# Patient Record
Sex: MALE | Race: WHITE
[De-identification: names, ages, dates, MRNs, and addresses within clinical notes are randomized per-mention and may not be internally consistent; named-entity substitution may affect disease eponyms.]

---

## 2018-03-23 ENCOUNTER — HOSPITAL ENCOUNTER (INPATIENT)
Dept: HOSPITAL 92 - SURG A | Age: 48
LOS: 7 days | Discharge: HOME | DRG: 235 | End: 2018-03-30
Attending: THORACIC SURGERY (CARDIOTHORACIC VASCULAR SURGERY) | Admitting: THORACIC SURGERY (CARDIOTHORACIC VASCULAR SURGERY)
Payer: COMMERCIAL

## 2018-03-23 ENCOUNTER — HOSPITAL ENCOUNTER (OUTPATIENT)
Dept: HOSPITAL 92 - LABBT | Age: 48
End: 2018-03-23
Attending: THORACIC SURGERY (CARDIOTHORACIC VASCULAR SURGERY)
Payer: COMMERCIAL

## 2018-03-23 VITALS — BODY MASS INDEX: 32.6 KG/M2

## 2018-03-23 DIAGNOSIS — Z87.891: ICD-10-CM

## 2018-03-23 DIAGNOSIS — Z79.82: ICD-10-CM

## 2018-03-23 DIAGNOSIS — Z01.812: Primary | ICD-10-CM

## 2018-03-23 DIAGNOSIS — I25.10: ICD-10-CM

## 2018-03-23 DIAGNOSIS — J45.909: ICD-10-CM

## 2018-03-23 DIAGNOSIS — E78.5: ICD-10-CM

## 2018-03-23 DIAGNOSIS — I10: ICD-10-CM

## 2018-03-23 DIAGNOSIS — Z79.02: ICD-10-CM

## 2018-03-23 DIAGNOSIS — I25.118: Primary | ICD-10-CM

## 2018-03-23 DIAGNOSIS — J96.01: ICD-10-CM

## 2018-03-23 DIAGNOSIS — E66.01: ICD-10-CM

## 2018-03-23 LAB
ANION GAP SERPL CALC-SCNC: 13 MMOL/L (ref 10–20)
BUN SERPL-MCNC: 17 MG/DL (ref 8.9–20.6)
CALCIUM SERPL-MCNC: 9.5 MG/DL (ref 7.8–10.44)
CHLORIDE SERPL-SCNC: 103 MMOL/L (ref 98–107)
CO2 SERPL-SCNC: 26 MMOL/L (ref 22–29)
CREAT CL PREDICTED SERPL C-G-VRATE: 0 ML/MIN (ref 70–130)
GLUCOSE SERPL-MCNC: 96 MG/DL (ref 70–105)
HGB BLD-MCNC: 16.4 G/DL (ref 14–18)
MCH RBC QN AUTO: 31.3 PG (ref 27–31)
MCV RBC AUTO: 91 FL (ref 80–94)
PLATELET # BLD AUTO: 291 THOU/UL (ref 130–400)
POTASSIUM SERPL-SCNC: 3.7 MMOL/L (ref 3.5–5.1)
RBC # BLD AUTO: 5.24 MILL/UL (ref 4.7–6.1)
SODIUM SERPL-SCNC: 138 MMOL/L (ref 136–145)
WBC # BLD AUTO: 8.1 THOU/UL (ref 4.8–10.8)

## 2018-03-23 PROCEDURE — 94002 VENT MGMT INPAT INIT DAY: CPT

## 2018-03-23 PROCEDURE — 93010 ELECTROCARDIOGRAM REPORT: CPT

## 2018-03-23 PROCEDURE — 93005 ELECTROCARDIOGRAM TRACING: CPT

## 2018-03-23 PROCEDURE — 93798 PHYS/QHP OP CAR RHAB W/ECG: CPT

## 2018-03-23 PROCEDURE — P9045 ALBUMIN (HUMAN), 5%, 250 ML: HCPCS

## 2018-03-23 PROCEDURE — S0017 INJECTION, AMINOCAPROIC ACID: HCPCS

## 2018-03-23 PROCEDURE — 86901 BLOOD TYPING SEROLOGIC RH(D): CPT

## 2018-03-23 PROCEDURE — 80048 BASIC METABOLIC PNL TOTAL CA: CPT

## 2018-03-23 PROCEDURE — 71045 X-RAY EXAM CHEST 1 VIEW: CPT

## 2018-03-23 PROCEDURE — 82805 BLOOD GASES W/O2 SATURATION: CPT

## 2018-03-23 PROCEDURE — 85025 COMPLETE CBC W/AUTO DIFF WBC: CPT

## 2018-03-23 PROCEDURE — 85027 COMPLETE CBC AUTOMATED: CPT

## 2018-03-23 PROCEDURE — 36416 COLLJ CAPILLARY BLOOD SPEC: CPT

## 2018-03-23 PROCEDURE — 85730 THROMBOPLASTIN TIME PARTIAL: CPT

## 2018-03-23 PROCEDURE — 36415 COLL VENOUS BLD VENIPUNCTURE: CPT

## 2018-03-23 PROCEDURE — 86900 BLOOD TYPING SEROLOGIC ABO: CPT

## 2018-03-23 PROCEDURE — 85610 PROTHROMBIN TIME: CPT

## 2018-03-23 PROCEDURE — 86850 RBC ANTIBODY SCREEN: CPT

## 2018-03-23 PROCEDURE — S0028 INJECTION, FAMOTIDINE, 20 MG: HCPCS

## 2018-03-24 NOTE — EKG
Test Reason : 

Blood Pressure : ***/*** mmHG

Vent. Rate : 096 BPM     Atrial Rate : 096 BPM

   P-R Int : 146 ms          QRS Dur : 092 ms

    QT Int : 352 ms       P-R-T Axes : 052 -42 087 degrees

   QTc Int : 444 ms

 

Normal sinus rhythm

Left axis deviation

Abnormal ECG

No previous ECGs available

Confirmed by FAVIOLA CAMPOS, DR. PIMENTEL ([id number]) on 3/24/2018 7:08:54 PM

 

Referred By:  YOMAIRA           Confirmed By:DR. MARGARITO SALMERON MD

## 2018-03-26 LAB
ANALYZER IN CARDIO: (no result)
ANALYZER IN CARDIO: (no result)
ANION GAP SERPL CALC-SCNC: 11 MMOL/L (ref 10–20)
APTT PPP: 27.9 SEC (ref 22.9–36.1)
BASE EXCESS STD BLDA CALC-SCNC: -4.4 MEQ/L
BASE EXCESS STD BLDA CALC-SCNC: -4.8 MEQ/L
BASOPHILS # BLD AUTO: 0.1 THOU/UL (ref 0–0.2)
BASOPHILS NFR BLD AUTO: 0.4 % (ref 0–1)
BUN SERPL-MCNC: 18 MG/DL (ref 8.9–20.6)
CA-I BLDA-SCNC: 1.1 MMOL/L (ref 1.12–1.3)
CA-I BLDA-SCNC: 1.1 MMOL/L (ref 1.12–1.3)
CALCIUM SERPL-MCNC: 7.6 MG/DL (ref 7.8–10.44)
CHLORIDE SERPL-SCNC: 109 MMOL/L (ref 98–107)
CO2 SERPL-SCNC: 21 MMOL/L (ref 22–29)
CREAT CL PREDICTED SERPL C-G-VRATE: 94 ML/MIN (ref 70–130)
EOSINOPHIL # BLD AUTO: 0.1 THOU/UL (ref 0–0.7)
EOSINOPHIL NFR BLD AUTO: 0.9 % (ref 0–10)
GLUCOSE SERPL-MCNC: 144 MG/DL (ref 70–105)
HCO3 BLDA-SCNC: 20.1 MEQ/L (ref 22–26)
HCO3 BLDA-SCNC: 20.5 MEQ/L (ref 22–26)
HCT VFR BLDA CALC: 33.7 % (ref 42–52)
HCT VFR BLDA CALC: 34.6 % (ref 42–52)
HGB BLD-MCNC: 12 G/DL (ref 14–18)
HGB BLD-MCNC: 12.2 G/DL (ref 14–18)
HGB BLDA-MCNC: 11.9 G/DL (ref 14–18)
HGB BLDA-MCNC: 12.2 G/DL (ref 14–18)
INR PPP: 1.3
LYMPHOCYTES # BLD: 2.1 THOU/UL (ref 1.2–3.4)
LYMPHOCYTES NFR BLD AUTO: 12.7 % (ref 21–51)
MCH RBC QN AUTO: 31.6 PG (ref 27–31)
MCV RBC AUTO: 91.5 FL (ref 80–94)
MONOCYTES # BLD AUTO: 0.9 THOU/UL (ref 0.11–0.59)
MONOCYTES NFR BLD AUTO: 5.6 % (ref 0–10)
NEUTROPHILS # BLD AUTO: 12.9 THOU/UL (ref 1.4–6.5)
NEUTROPHILS NFR BLD AUTO: 80.3 % (ref 42–75)
O2 A-A PPRESDIFF RESPIRATORY: 146.12 MM[HG] (ref 0–20)
O2 A-A PPRESDIFF RESPIRATORY: 62.58 MM[HG] (ref 0–20)
PCO2 BLDA: 36.5 MMHG (ref 35–45)
PCO2 BLDA: 37.1 MMHG (ref 35–45)
PH BLDA: 7.36 [PH] (ref 7.35–7.45)
PH BLDA: 7.36 [PH] (ref 7.35–7.45)
PLATELET # BLD AUTO: 172 THOU/UL (ref 130–400)
PO2 BLDA: 164 MMHG (ref 80–100)
PO2 BLDA: 177 MMHG (ref 80–100)
POTASSIUM SERPL-SCNC: 4 MMOL/L (ref 3.5–5.1)
POTASSIUM SERPL-SCNC: 5.2 MMOL/L (ref 3.5–5.1)
PROTHROMBIN TIME: 16 SEC (ref 12–14.7)
RBC # BLD AUTO: 3.85 MILL/UL (ref 4.7–6.1)
SODIUM SERPL-SCNC: 137 MMOL/L (ref 136–145)
SPECIMEN DRAWN FROM PATIENT: (no result)
SPECIMEN DRAWN FROM PATIENT: (no result)
WBC # BLD AUTO: 16.1 THOU/UL (ref 4.8–10.8)

## 2018-03-26 PROCEDURE — 5A1221Z PERFORMANCE OF CARDIAC OUTPUT, CONTINUOUS: ICD-10-PCS | Performed by: THORACIC SURGERY (CARDIOTHORACIC VASCULAR SURGERY)

## 2018-03-26 PROCEDURE — 02100AW BYPASS CORONARY ARTERY, ONE ARTERY FROM AORTA WITH AUTOLOGOUS ARTERIAL TISSUE, OPEN APPROACH: ICD-10-PCS | Performed by: THORACIC SURGERY (CARDIOTHORACIC VASCULAR SURGERY)

## 2018-03-26 PROCEDURE — 03BC4ZZ EXCISION OF LEFT RADIAL ARTERY, PERCUTANEOUS ENDOSCOPIC APPROACH: ICD-10-PCS | Performed by: THORACIC SURGERY (CARDIOTHORACIC VASCULAR SURGERY)

## 2018-03-26 PROCEDURE — 021209W BYPASS CORONARY ARTERY, THREE ARTERIES FROM AORTA WITH AUTOLOGOUS VENOUS TISSUE, OPEN APPROACH: ICD-10-PCS | Performed by: THORACIC SURGERY (CARDIOTHORACIC VASCULAR SURGERY)

## 2018-03-26 PROCEDURE — 06BQ4ZZ EXCISION OF LEFT SAPHENOUS VEIN, PERCUTANEOUS ENDOSCOPIC APPROACH: ICD-10-PCS | Performed by: THORACIC SURGERY (CARDIOTHORACIC VASCULAR SURGERY)

## 2018-03-26 PROCEDURE — 02100Z9 BYPASS CORONARY ARTERY, ONE ARTERY FROM LEFT INTERNAL MAMMARY, OPEN APPROACH: ICD-10-PCS | Performed by: THORACIC SURGERY (CARDIOTHORACIC VASCULAR SURGERY)

## 2018-03-26 RX ADMIN — INSULIN HUMAN PRN UNIT: 100 INJECTION, SOLUTION PARENTERAL at 23:53

## 2018-03-26 RX ADMIN — INSULIN HUMAN PRN UNIT: 100 INJECTION, SOLUTION PARENTERAL at 13:09

## 2018-03-26 RX ADMIN — Medication SCH GM: at 21:16

## 2018-03-26 RX ADMIN — Medication SCH GM: at 13:35

## 2018-03-26 RX ADMIN — HYDROCODONE BITARTRATE AND ACETAMINOPHEN PRN TAB: 5; 325 TABLET ORAL at 23:48

## 2018-03-26 NOTE — CON
DATE OF CONSULTATION:  03/26/2018

 

SERVICE:  Pulmonary Medicine.

 

REASON FOR CONSULTATION:  ICU patient.

 

HISTORY OF PRESENT ILLNESS:  The patient is a 48-year-old white male with past medical history signif
icant for morbid obesity, dyslipidemia, and hypertension.  He was in his usual state of health when s
tarted having onset of increasing dyspnea that limited his activity.  He was being worked up in the o
[facility] setting where he was demonstrated to have severe 3-vessel disease.  He was referred for a c
oronary bypass graft surgery.  He is immediately postop day #0 from that surgery.  He is breathing co
mfortably on mechanical ventilation.  The weaning protocol is in place.  He is awake and alert.  He i
s following all commands.  Otherwise, he was in his usual state of health prior to the operation.

 

PAST MEDICAL HISTORY:

1.  Asthma.

2.  Dyslipidemia.

3.  Hypertension.

4.  Coronary artery disease.

 

PAST SURGICAL HISTORY:

1.  Herniorrhaphy.

2.  Right shoulder surgery.

3.  Repair of femur fracture.

 

FAMILY HISTORY:  Noncontributory.

 

SOCIAL HISTORY:  He drinks some alcohol occasionally.  He denies any illicit drugs or smoking cigaret
katarina.  He did some chewing tobacco previously.  He has no exposure to chemicals, dust asbestos or tube
rculosis.

 

ALLERGIES:  No known drug allergies.

 

MEDICATIONS:  List of his inpatient medications were reviewed.  There are no specific updates made at
 this time.

 

REVIEW OF SYSTEMS:  General, head, ears, eyes, nose, throat, cardiovascular, respiratory, GI, , mus
culoskeletal, neurologic and skin is negative except as mentioned in the HPI.

 

PHYSICAL EXAMINATION:

VITAL SIGNS:  Afebrile, pulse 82, blood pressure 94/46, respirations 8, saturation 100% on 2 liters n
yeni cannula.

HEENT:  Normocephalic, atraumatic.  Sclerae are white, conjunctivae pink.  Oral and nasal mucosa is m
oist without lesions.

LUNGS:  Decent air entry.  He has no prolonged expiratory phase.  There are some crackles present, bu
t I do not appreciate rhonchi or wheezing currently.

HEART:  Normal rate, regular.

ABDOMEN:  Soft.  Tender to palpation, particularly in the epigastric region.  There is no rebound or 
guarding.  Bowel sounds are hypoactive.

GENITOURINARY:  Cooper catheter in place.

NEUROLOGIC:  Grossly nonfocal.

 

LABORATORY DATA:  WBC 16.1, hemoglobin 12.0 and roughly stable.  INR is 1.3.  PH of 7.36, pCO2 of 37,
 pO2 of 164.  Creatinine is 1.39 and roughly stable.  Basic metabolic profile is otherwise unremarkab
le.  Calcium is 7.6, however.

 

IMAGING:  Chest x-ray demonstrates a left-sided chest tube.  There is a mediastinal drain is also in 
place.  Median sternotomy wires are there.  The endotracheal tube terminates roughly 5 cm above the l
evel of the radha.  There is likely a left-sided pleural effusion present.  The right costophrenic a
ngle remains sharp.  The subclavian central venous catheter terminates in the right atrium.

 

ASSESSMENT:

1.  Acute hypoxic respiratory failure.

2.  Asthma without acute exacerbation.

3.  Coronary artery disease, status post coronary artery bypass graft x5 vessels.

 

PLAN:  We will provide the patient with p.r.n. nebulized medications.  We will continue our routine p
ostop care.  Pulmonary Critical Care will continue to follow in this location.  Ultimately, the patie
nt may benefit from outpatient evaluation for sleep apnea.

 

CRITICAL CARE TIME:  30 minutes.

## 2018-03-26 NOTE — OP
POSTOPERATIVE DIAGNOSIS:  Coronary artery disease.

 

PROCEDURE:  Coronary artery bypass graft x5, good quality LIMA to a 1.5 mm LAD at the takeoff of the 
second diagonal and 1.25 mm LAD distally on the heart.  Good quality radial artery to a 1.25 mm OM, s
aphenous vein good quality to a 1.5 mm proximal diagonal and a 2 mm posterolateral.

 

SURGEON:  Dr. Saunders.

 

ASSISTANT:  Dr. Mckeon.

 

TRANSFUSION:  None.

 

PROCEDURE IN DETAIL:  After adequate anesthesia had been obtained, the patient was prepped and draped
 and I harvested the left radial artery.  Collateral flow was ensured with plethysmography and that t
his wound was closed.  I then began the endovascular vein harvest of the greater saphenous vein, mobi
lizing the vein on the left.  Prior to dividing any branches, I turned my attention to median sternot
donnell where left internal mammary artery was harvested, at which point Dr. Mckeon came and completed the
 endovascular vein harvest.  The patient was heparinized, mammary divided distally, and passed poster
ior to the thymus gland.  Aorta and right atrium were cannulated and cardiopulmonary bypass was insti
tuted.  Vessels were inspected, aorta cross-clamped, and a liter of cold blood cardioplegia was given
 through the aortic root.  The lateral aspect of the heart had significant scar tissue related to pre
vious occlusion of the circumflex system.  Transesophageal echo, however, showed there was fairly goo
d lateral wall motion.  Distal anastomoses were all completed following which the cross-clamp was rem
kori and the partial occluding clamp placed and 2 vein grafts anastomosed to the aortic root from the
 right and diagonal vein grafts.  These were marked with rings following which the radial artery was 
anastomosed to the shane of the diagonal vein graft.  The patient was then weaned from cardiopulmonary
 bypass, cannulas removed, and protamine given systemically.  Aortic cannulation site was secured wit
h a 4-0 Prolene suture.  Mediastinal and left pleural drains were placed, following which the sternum
 was reapproximated with #7 interrupted wire using vancomycin paste on the sternal edges, platelet ri
ch blood, and platelet-poor plasma.  Subcutaneous tissue and skin were closed in layers.

## 2018-03-26 NOTE — RAD
PORTABLE CHEST 1 VIEW:

 

Date:  03/26/18 

Time:  1223 hours

 

HISTORY:  

Postop open heart surgery. Respiratory failure. 

 

FINDINGS/IMPRESSION: 

There are changes of median sternotomy. There is an endotracheal tube with tip at the level of the cl
avicular heads. A nasogastric tube is seen in the stomach. There is a right subclavian central line w
ith tip in the projection of the SVC. The heart size is enlarged. Mediastinal and left-sided chest tu
bes are present. No lobar consolidation, pneumothoraces, sindy pulmonary edema, or large effusions ar
e identified. A small left pleural effusion may be present. 

 

 

POS: [facility]

## 2018-03-27 LAB
ANION GAP SERPL CALC-SCNC: 10 MMOL/L (ref 10–20)
BASOPHILS # BLD AUTO: 0 THOU/UL (ref 0–0.2)
BASOPHILS NFR BLD AUTO: 0.2 % (ref 0–1)
BUN SERPL-MCNC: 17 MG/DL (ref 8.9–20.6)
CALCIUM SERPL-MCNC: 7.8 MG/DL (ref 7.8–10.44)
CHLORIDE SERPL-SCNC: 107 MMOL/L (ref 98–107)
CO2 SERPL-SCNC: 24 MMOL/L (ref 22–29)
CREAT CL PREDICTED SERPL C-G-VRATE: 93 ML/MIN (ref 70–130)
EOSINOPHIL # BLD AUTO: 0 THOU/UL (ref 0–0.7)
EOSINOPHIL NFR BLD AUTO: 0.1 % (ref 0–10)
GLUCOSE SERPL-MCNC: 117 MG/DL (ref 70–105)
HGB BLD-MCNC: 12.1 G/DL (ref 14–18)
LYMPHOCYTES # BLD: 2.1 THOU/UL (ref 1.2–3.4)
LYMPHOCYTES NFR BLD AUTO: 18.9 % (ref 21–51)
MCH RBC QN AUTO: 32.4 PG (ref 27–31)
MCV RBC AUTO: 93.5 FL (ref 80–94)
MONOCYTES # BLD AUTO: 1.3 THOU/UL (ref 0.11–0.59)
MONOCYTES NFR BLD AUTO: 11.9 % (ref 0–10)
NEUTROPHILS # BLD AUTO: 7.6 THOU/UL (ref 1.4–6.5)
NEUTROPHILS NFR BLD AUTO: 68.8 % (ref 42–75)
PLATELET # BLD AUTO: 208 THOU/UL (ref 130–400)
POTASSIUM SERPL-SCNC: 4.4 MMOL/L (ref 3.5–5.1)
RBC # BLD AUTO: 3.72 MILL/UL (ref 4.7–6.1)
SODIUM SERPL-SCNC: 137 MMOL/L (ref 136–145)
WBC # BLD AUTO: 11.1 THOU/UL (ref 4.8–10.8)

## 2018-03-27 RX ADMIN — Medication SCH GM: at 05:13

## 2018-03-27 RX ADMIN — HYDROCODONE BITARTRATE AND ACETAMINOPHEN PRN TAB: 5; 325 TABLET ORAL at 04:48

## 2018-03-27 NOTE — RAD
PORTABLE UPRIGHT FRONTAL CHEST RADIOGRAPH:

 

Date: 3-27-18 

 

Comparison: 3-16-18

 

History: Evaluate chest following open heart surgery. 

 

FINDINGS: 

Post-surgical drainage catheters overlie the lower left hemithorax and the mediastinum. Endotracheal 
tube and nasogastric tube have been removed. Midline sternotomy wires are unchanged. Stable right wally
ed vascular catheter. Mild hazy increased density is seen in the medial left lung base suggesting vol
ume loss. No focal consolidation or alveolar edema. 

 

IMPRESSION: 

Lines and tubes as detailed above. Mild patchy opacity in the medial left base noted.  

 

 

 

POS: [facility]

## 2018-03-27 NOTE — PDOC.CTH
Cardiology Progress Note





- Subjective





He had his chest tubes our and is on the floor now. He is having a lot of pain 

issues on current pain regimen. 





- Objective


 Vital Signs











  Temp Pulse Pulse Pulse Resp BP BP


 


 03/27/18 16:48  99.3 F  89    16  


 


 03/27/18 12:55  98.5 F      


 


 03/27/18 10:00  97.9 F  69    16  


 


 03/27/18 08:44    69  79   134/56 L  103/68


 


 03/27/18 07:10  98.1 F  72    12  














  BP Pulse Ox Pulse Ox Pulse Ox


 


 03/27/18 16:48  118/68  91 L  


 


 03/27/18 12:55  114/68   


 


 03/27/18 10:00   91 L  


 


 03/27/18 08:44    93 L  93 L


 


 03/27/18 07:10   94 L  








 











Weight                         226 lb 13.69 oz














 











 03/26/18 03/27/18 03/28/18





 06:59 06:59 06:59


 


Intake Total  2976 


 


Output Total  2325 270


 


Balance  651 -270














- Physical Examination


General/Neuro: alert & oriented x3, NAD


Neck: no JVD present


Lungs: CTA, unlabored respirations


Heart: RRR


Abdomen: NT/ND


Extremities: + edema B (1+)





- Telemetry


Telemetry Rhythm: NSR





- Labs


Result Diagrams: 


 03/27/18 04:16





 03/27/18 04:16





- Assessment/Plan





1. Multivessel CAD.


2. S/P CABG


3. HTN


4. HLP





PLAN:


- Increase PT as tolerated.


- Will start low dose BB. ACEI if BP allows.


- Aspirin and statin for life. 


- Will try toradol for pain.

## 2018-03-27 NOTE — PRG
DATE OF SERVICE:  03/27/2018

 

SERVICE:  Pulmonary Medicine.

 

INTERVAL HISTORY:  The patient is doing fine from a respiratory standpoint.  He was having significan
t amounts of back discomfort this morning.  When the chest tubes were pulled, it is eased up a little
 bit, although it is still there.  He previously had horrendous pleuritic chest discomfort that made 
for multiple nights.  That being said, this is also improved.  He denies any current fevers, chills o
r overnight events.

 

PHYSICAL EXAMINATION:

VITAL SIGNS:  Afebrile, pulse 72, blood pressure 199/64, respirations 12, saturation 94% on 2 liters 
nasal cannula.

GENERAL:  The patient is awake, alert, no apparent distress.

LUNGS:  Decent air entry.  I do not appreciate wheezing, rhonchi, or crackles today.

HEART:  Normal rate, regular.

ABDOMEN:  Soft, nontender, nondistended.  Bowel sounds are positive.

MUSCULOSKELETAL:  No cyanosis or clubbing.  There is a trace pitting in the bilateral lower extremiti
es.

NEUROLOGIC:  Grossly nonfocal.

 

LABORATORY DATA:  WBC 11.1, hemoglobin 12.1 and stable, platelets 208,000.  INR 1.3.  PH 7.36, pCO2 3
6, pO2 177 as of yesterday.  Creatinine 1.41 and roughly stable.  Basic metabolic profile is otherwis
e unremarkable.  Calcium 7.8.

 

IMAGING:  Chest x-ray demonstrates stable tubes and lines.  Interval extubation has been performed.  
There is mediastinal, and left-sided chest tube which remain in stable position.  A right subclavian 
central venous catheter terminates at the cavoatrial junction.  No obvious effusion or infiltrate is 
evident.  There seems to be a little volume loss in both lungs.

 

ASSESSMENT:

1.  Acute hypoxic respiratory failure, possibly secondary to a little atelectasis.

2.  Asthma without acute exacerbation.

3.  Coronary artery disease, status post coronary artery bypass graft x5 vessels, postop day #1.

 

PLAN:  We will continue p.r.n. nebulized medications.  In the outpatient setting, the patient may kaylee
efit from a sleep apnea evaluation.  We will talk to the patient about whether or not he is intereste
d in this on the floor.

## 2018-03-27 NOTE — CON
DATE OF CONSULTATION:  03/26/2018

 

REASON FOR CONSULTATION:  Coronary artery disease, bypass surgery.

 

HISTORY OF PRESENT ILLNESS:  Mr. Chadwick is a very pleasant 48-year-old white gentleman, very well know
n to myself who comes to the hospital for a planned bypass surgery.  He presented to my office with s
ymptoms of chest pain and shortness of breath with exertion.  He had a very abnormal stress test and 
had heart catheterization that showed multivessel disease.  He underwent bypass surgery earlier today
 by Dr. Saunders.  He did well postoperatively.  On my evaluation today, he was still intubated, but hi
ke and following commands.  Blood pressure is maintaining without issues.

 

PAST MEDICAL HISTORY:

1.  Bronchial asthma.

2.  Hyperlipidemia.

3.  Hypertension.

4.  Coronary artery disease.

 

PAST SURGICAL HISTORY:

1.  Herniorrhaphy.

2.  Right shoulder surgery.

3.  Femur fracture repair.

4.  CABG x5 as above.

 

FAMILY HISTORY:  Noncontributory.

 

SOCIAL HISTORY:  Social alcohol use, no drug or tobacco use.  He used to be an athlete in college.

 

ALLERGIES:  No known drug allergies.

 

OUTPATIENT MEDICATIONS:

1.  Aspirin 81 a day.

2.  Albuterol inhaler.

3.  Zolpidem.

4.  Hydrochlorothiazide 25 mg.

5.  Losartan 100 mg a day.

6.  Atorvastatin 20 mg a day.

 

REVIEW OF SYSTEMS:  Unobtainable as the patient is sedated and intubated.

 

PHYSICAL EXAMINATION:

VITAL SIGNS:  Temperature 97.4, pulse 82, respiration rate 12, satting 96% on 30% FiO2.

GENERAL:  Awake, following commands, but remains intubated.

LUNGS:  Clear.

CARDIOVASCULAR:  S1, S2.  There is a 2 component rub likely from the chest.

ABDOMEN:  Soft.

EXTREMITIES:  1+ edema.

SKIN:  Warm and dry.

 

LABORATORY WORK:  Reviewed.

 

ASSESSMENT AND PLAN:

1.  Coronary artery disease, status post bypass surgery.

2.  Status post coronary artery bypass graft x5.

 

PLAN:

1.  Continue postop care.  Most likely, he will be extubated soon as he is already awake and followin
g commands.

2.  Aspirin, statin for life.  Beta blocker and ARB inhibitor once blood pressure allows.  Most likel
y, all his blood pressure medication will be changed completely once he is discharged.  Cross cover c
hange.

3.  We will follow.

## 2018-03-28 LAB
ANALYZER IN CARDIO: (no result)
BASE EXCESS STD BLDA CALC-SCNC: -2.5 MEQ/L
BASE EXCESS STD BLDA CALC-SCNC: -2.8 MEQ/L
BASE EXCESS STD BLDA CALC-SCNC: -3.5 MEQ/L
BASE EXCESS STD BLDA CALC-SCNC: -3.6 MEQ/L
BASE EXCESS STD BLDA CALC-SCNC: -4.7 MEQ/L
CA-I BLDA-SCNC: 0.9 MMOL/L (ref 1.12–1.3)
CA-I BLDA-SCNC: 1.1 MMOL/L (ref 1.12–1.3)
CA-I BLDA-SCNC: 1.2 MMOL/L (ref 1.12–1.3)
HCO3 BLDA-SCNC: 20.1 MEQ/L (ref 22–26)
HCO3 BLDA-SCNC: 20.8 MEQ/L (ref 22–26)
HCO3 BLDA-SCNC: 21.9 MEQ/L (ref 22–26)
HCO3 BLDA-SCNC: 22.2 MEQ/L (ref 22–26)
HCO3 BLDA-SCNC: 22.5 MEQ/L (ref 22–26)
HCT VFR BLDA CALC: 28.3 % (ref 42–52)
HCT VFR BLDA CALC: 29 % (ref 42–52)
HCT VFR BLDA CALC: 29.9 % (ref 42–52)
HCT VFR BLDA CALC: 39.1 % (ref 42–52)
HCT VFR BLDA CALC: 41.1 % (ref 42–52)
HGB BLDA-MCNC: 10 G/DL (ref 14–18)
HGB BLDA-MCNC: 10.3 G/DL (ref 14–18)
HGB BLDA-MCNC: 13.5 G/DL (ref 14–18)
HGB BLDA-MCNC: 13.9 G/DL (ref 14–18)
HGB BLDA-MCNC: 9.7 G/DL (ref 14–18)
PCO2 BLDA: 34.6 MMHG (ref 35–45)
PCO2 BLDA: 36.7 MMHG (ref 35–45)
PCO2 BLDA: 37.6 MMHG (ref 35–45)
PCO2 BLDA: 40 MMHG (ref 35–45)
PCO2 BLDA: 43 MMHG (ref 35–45)
PH BLDA: 7.33 [PH] (ref 7.35–7.45)
PH BLDA: 7.36 [PH] (ref 7.35–7.45)
PH BLDA: 7.37 [PH] (ref 7.35–7.45)
PH BLDA: 7.38 [PH] (ref 7.35–7.45)
PH BLDA: 7.4 [PH] (ref 7.35–7.45)
PO2 BLDA: 393.3 MMHG (ref 80–100)
PO2 BLDA: 428.4 MMHG (ref 80–100)
PO2 BLDA: 448.5 MMHG (ref 80–100)
PO2 BLDA: 485.6 MMHG (ref 80–100)
PO2 BLDA: 557.2 MMHG (ref 80–100)
SPECIMEN DRAWN FROM PATIENT: (no result)

## 2018-03-28 NOTE — PDOC.CTH
Cardiology Progress Note





- Subjective


He is doing better today, His pain is better controlled with toradol. 





- Objective


 Vital Signs











  Temp Pulse Pulse Pulse Resp BP BP


 


 03/28/18 16:45  99.3 F  91    18  


 


 03/28/18 14:50    99  90   119/61  111/59 L


 


 03/28/18 11:29  98.7 F  87    15  


 


 03/28/18 08:37    88  85   121/68  126/67


 


 03/28/18 08:35  99.7 F H  87    16  


 


 03/28/18 08:33  99.7 F H  87    16  














  BP BP Pulse Ox Pulse Ox Pulse Ox


 


 03/28/18 16:45  117/69   93 L  


 


 03/28/18 14:50     94 L  95


 


 03/28/18 11:29   121/66  91 L  


 


 03/28/18 08:37     94 L  97


 


 03/28/18 08:35    93 L  


 


 03/28/18 08:33  126/67    








 











Weight                         232 lb 9.6 oz














 











 03/27/18 03/28/18 03/29/18





 06:59 06:59 06:59


 


Intake Total 2976 960 1200


 


Output Total 2325 595 450


 


Balance 651 365 750














- Physical Examination


General/Neuro: alert & oriented x3, NAD


Neck: no JVD present


Lungs: CTA, unlabored respirations


Heart: RRR, other: (2 component rub. )


Abdomen: NT/ND


Extremities: + edema B (1+)





- Telemetry


Telemetry Rhythm: NSR





- Labs


Result Diagrams: 


 03/27/18 04:16





 03/27/18 04:16





- Assessment/Plan





1. Multivessel CAD.


2. S/P CABG


3. HTN


4. HLP





PLAN:


- Increase PT as tolerated.


- BP borderline low for BB & ACEI.


- Aspirin and statin for life. 


- Toradol working well for pain.

## 2018-03-28 NOTE — PRG
DATE OF SERVICE:  03/28/2018

 

SERVICE:  Pulmonary Medicine.

 

INTERVAL HISTORY:  The patient is doing fantastic from a respiratory perspective.  He has been weaned
 down to room air.  He denies any chest pain, fevers, chills, nausea, vomiting or shortness of breath
.  He got up and walked today, up and down the hallway.  He has done this on 4 separate occasions.  O
therwise, he is feeling okay.  He is returning to his usual state slowly.

 

PHYSICAL EXAMINATION:

VITAL SIGNS:  Afebrile, pulse 91, blood pressure 117/69, respirations 18, saturation 93% on room air.


GENERAL:  The patient is awake, alert, no apparent distress.

LUNGS:  Decent air entry without prolonged expiratory phase, wheezing, rhonchi or crackles.

HEART:  Normal rate, regular.

ABDOMEN:  Soft, nontender, and nondistended.  Bowel sounds are positive.

MUSCULOSKELETAL:  No cyanosis or clubbing.  There is trace pitting in the bilateral lower extremities
.

NEUROLOGIC:  Grossly nonfocal.

 

LABORATORY DATA:  Glucose ranges from 121-136.

 

ASSESSMENT:

1.  Acute hypoxic respiratory failure, resolved.

2.  Asthma without acute exacerbation.

3.  Coronary artery disease, status post coronary artery bypass graft x5 vessels, postop day #2.

 

DISCUSSION AND PLAN:  At this point, the patient has no requirements for ongoing pulmonary or [facility]
 care opinion.  He has no signs of an acute asthma attack at this time.  If he develops any increasi
ng respiratory distress, give me a phone call.  Otherwise, I will sign off.

## 2018-03-29 LAB
ANION GAP SERPL CALC-SCNC: 9 MMOL/L (ref 10–20)
BUN SERPL-MCNC: 21 MG/DL (ref 8.9–20.6)
CALCIUM SERPL-MCNC: 8.6 MG/DL (ref 7.8–10.44)
CHLORIDE SERPL-SCNC: 105 MMOL/L (ref 98–107)
CO2 SERPL-SCNC: 27 MMOL/L (ref 22–29)
CREAT CL PREDICTED SERPL C-G-VRATE: 82 ML/MIN (ref 70–130)
GLUCOSE SERPL-MCNC: 99 MG/DL (ref 70–105)
POTASSIUM SERPL-SCNC: 4.2 MMOL/L (ref 3.5–5.1)
SODIUM SERPL-SCNC: 137 MMOL/L (ref 136–145)

## 2018-03-29 NOTE — PDOC.CTH
Cardiology Progress Note





- Subjective





Doing well. He had a BM today and feels much better. 





- Objective


 Vital Signs











  Temp Pulse Pulse Pulse Resp BP BP


 


 03/29/18 16:05  98.1 F  84    18  


 


 03/29/18 13:34     87    117/66


 


 03/29/18 12:06  98.1 F  83    18  


 


 03/29/18 09:51    84  84   129/69  128/71


 


 03/29/18 08:40  98.2 F  85    18  


 


 03/29/18 08:00  98.2 F  85    18  














  BP BP Pulse Ox Pulse Ox


 


 03/29/18 16:05  113/53 L   91 L 


 


 03/29/18 13:34     70 L


 


 03/29/18 12:06  118/67   97 


 


 03/29/18 09:51    


 


 03/29/18 08:40   108/58 L  


 


 03/29/18 08:00    








 











Weight                         231 lb 6.4 oz














 











 03/28/18 03/29/18 03/30/18





 06:59 06:59 06:59


 


Intake Total 960 1200 


 


Output Total 595 450 


 


Balance 365 750 














- Physical Examination


General/Neuro: alert & oriented x3, NAD


Neck: no JVD present


Lungs: CTA, unlabored respirations


Heart: RRR


Abdomen: NT/ND


Extremities: + edema B (1+ left.)





- Telemetry


Telemetry Rhythm: NSR





- Labs


Result Diagrams: 


 03/27/18 04:16





 03/29/18 06:02





- Assessment/Plan





1. Multivessel CAD.


2. S/P CABG


3. HTN


4. HLP





PLAN:


- Increase PT as tolerated.


- Will start very low dose Toprol XL.


- Aspirin and statin for life. 


- Home at any time from cardiac perspective.

## 2018-03-30 VITALS — SYSTOLIC BLOOD PRESSURE: 142 MMHG | TEMPERATURE: 98.5 F | DIASTOLIC BLOOD PRESSURE: 89 MMHG

## 2018-03-30 NOTE — PDOC.CTH
Cardiology Progress Note





- Subjective





he is doing very well. He has been walking around without issues, he does get 

more SOB than normal but imroving every day. 





- Objective


 Vital Signs











  Temp Pulse Pulse Resp BP BP BP


 


 03/30/18 09:04    70   144/69 H  


 


 03/30/18 09:00  97.5 F L  72   18    126/75


 


 03/30/18 08:00  97.5 F L  72   18   


 


 03/30/18 04:02  98.2 F  77   18   109/76 














  Pulse Ox


 


 03/30/18 09:04 


 


 03/30/18 09:00 


 


 03/30/18 08:00 


 


 03/30/18 04:02  94 L








 











Weight                         233 lb 4.8 oz














 











 03/29/18 03/30/18 03/31/18





 06:59 06:59 06:59


 


Intake Total 1200  


 


Output Total 450  


 


Balance 750  














- Physical Examination


General/Neuro: alert & oriented x3, NAD


Neck: no JVD present


Lungs: CTA, unlabored respirations


Heart: RRR


Abdomen: NT/ND


Extremities: + edema B (1+)





- Telemetry


Telemetry Rhythm: NSR





- Labs


Result Diagrams: 


 03/27/18 04:16





 03/29/18 06:02





- Assessment/Plan





1. Multivessel CAD.


2. S/P CABG


3. HTN


4. HLP





PLAN:


- May d/c home today.


- Continue Toprol XL, very low dose. 


- His BP is borderline low and will hold on starting ACEI for now. 


- Aspirin and stain for life.


- Follow up with me in 1 month.

## 2018-03-30 NOTE — DIS
HOSPITAL COURSE:  The patient was admitted for coronary bypass grafting on 03/26 with vessels grafted
 to the LAD proximally and distally.  Radial artery to a small obtuse marginal that was previously co
mpletely occluded, a saphenous vein good quality to a diagonal and a posterolateral branch.  The lawanda
ent's postoperative course was uneventful and he was discharged home on 03/30 to resume his aspirin a
nd statin at home as well as a prescription for metoprolol 12.5 b.i.d. and Norco 5/325 for pain.  He 
is also to resume his aspirin at home.  His losartan has been put on hold for the present time and ma
y be resumed in the future as his HCTZ.  His discharge creatinine was 1.6 compared with about 1.4 on 
admission.